# Patient Record
Sex: FEMALE | Race: WHITE | URBAN - METROPOLITAN AREA
[De-identification: names, ages, dates, MRNs, and addresses within clinical notes are randomized per-mention and may not be internally consistent; named-entity substitution may affect disease eponyms.]

---

## 2018-07-09 ENCOUNTER — OFFICE VISIT (OUTPATIENT)
Dept: URGENT CARE | Facility: URGENT CARE | Age: 18
End: 2018-07-09
Payer: OTHER MISCELLANEOUS

## 2018-07-09 VITALS
OXYGEN SATURATION: 98 % | TEMPERATURE: 98.2 F | HEART RATE: 77 BPM | DIASTOLIC BLOOD PRESSURE: 74 MMHG | SYSTOLIC BLOOD PRESSURE: 112 MMHG | WEIGHT: 125.2 LBS

## 2018-07-09 DIAGNOSIS — L23.7 CONTACT DERMATITIS DUE TO POISON IVY: Primary | ICD-10-CM

## 2018-07-09 PROCEDURE — 99203 OFFICE O/P NEW LOW 30 MIN: CPT | Performed by: PHYSICIAN ASSISTANT

## 2018-07-09 RX ORDER — NORETHINDRONE ACETATE AND ETHINYL ESTRADIOL AND FERROUS FUMARATE 1MG-20(24)
KIT ORAL
COMMUNITY
Start: 2018-05-11

## 2018-07-09 RX ORDER — PREDNISONE 10 MG/1
TABLET ORAL
Qty: 45 TABLET | Refills: 0 | Status: SHIPPED | OUTPATIENT
Start: 2018-07-09

## 2018-07-09 ASSESSMENT — ENCOUNTER SYMPTOMS
DIZZINESS: 0
DIARRHEA: 0
SHORTNESS OF BREATH: 0
NECK STIFFNESS: 0
HEADACHES: 0
JOINT SWELLING: 0
VOMITING: 0
COUGH: 0
WOUND: 0
PALPITATIONS: 0
EYES NEGATIVE: 1
ARTHRALGIAS: 0
MUSCULOSKELETAL NEGATIVE: 1
MYALGIAS: 0
WEAKNESS: 0
ALLERGIC/IMMUNOLOGIC NEGATIVE: 1
ENDOCRINE NEGATIVE: 1
RHINORRHEA: 0
LIGHT-HEADEDNESS: 0
BACK PAIN: 0
FEVER: 0
NAUSEA: 0
RESPIRATORY NEGATIVE: 1
SORE THROAT: 0
HEMATOLOGIC/LYMPHATIC NEGATIVE: 1
CHILLS: 0
BRUISES/BLEEDS EASILY: 0
NECK PAIN: 0
CARDIOVASCULAR NEGATIVE: 1

## 2018-07-09 NOTE — PROGRESS NOTES
Chief Complaint:     Chief Complaint   Patient presents with     Hives     Patient complains of hives all over        HPI: Siria Cleaning is a 17 year old female who presents with a rash on the chest, inner thighs, groin and scrotum, legs, arms and face.  Patient is new to [place].  The rash was first noticed  8 days ago, and has not spread. 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  She has been using Calamine lotion, hydrocortizone cream, and Benadryl with minimal relief.  She has had exposures in the past that required treatment with oral prednisone.     Associated Symptoms:none    ROS:      Review of Systems   Constitutional: Negative for chills and fever.   HENT: Negative for congestion, ear pain, rhinorrhea and sore throat.    Eyes: Negative.    Respiratory: Negative.  Negative for cough and shortness of breath.    Cardiovascular: Negative.  Negative for chest pain and palpitations.   Gastrointestinal: Negative for diarrhea, nausea and vomiting.   Endocrine: Negative.    Genitourinary: Negative.    Musculoskeletal: Negative.  Negative for arthralgias, back pain, joint swelling, myalgias, neck pain and neck stiffness.   Skin: Positive for rash. Negative for wound.   Allergic/Immunologic: Negative.  Negative for immunocompromised state.   Neurological: Negative for dizziness, weakness, light-headedness and headaches.   Hematological: Negative.  Does not bruise/bleed easily.     No pertinent family or medical Hx at this time.  Patient has never smoked.      Family History   No family history on file.     Problem history  There is no problem list on file for this patient.       Allergies  No Known Allergies     Social History  Social History     Social History     Marital status: Single     Spouse name: N/A     Number of children: N/A     Years of education: N/A     Occupational History     Not on file.     Social History Main Topics     Smoking status: Never Smoker     Smokeless  tobacco: Never Used     Alcohol use Not on file     Drug use: Not on file     Sexual activity: Not on file     Other Topics Concern     Not on file     Social History Narrative     No narrative on file        Current Meds    Current Outpatient Prescriptions:      JUNEL FE 24 1-20 MG-MCG(24) tablet, , Disp: , Rfl:      predniSONE (DELTASONE) 10 MG tablet, Take 5 tablets for 3 days, then 4 for 3 days, 3 for 3 days, 2 for 3 days, 1 for 3 days., Disp: 45 tablet, Rfl: 0     Immunizations    There is no immunization history on file for this patient.        OBJECTIVE:     Vital signs reviewed by Deric Benitez  /74 (BP Location: Left arm, Patient Position: Chair, Cuff Size: Adult Regular)  Pulse 77  Temp 98.2  F (36.8  C) (Oral)  Wt 125 lb 3.2 oz (56.8 kg)  SpO2 98%     PEFR:  General appearance: healthy, alert and no distress    Physical Exam   Constitutional: She appears well-developed and well-nourished. No distress.   HENT:   Head: Normocephalic and atraumatic.   Right Ear: Tympanic membrane and external ear normal.   Left Ear: Tympanic membrane and external ear normal.   Mouth/Throat: Oropharynx is clear and moist.   Eyes: EOM are normal. Pupils are equal, round, and reactive to light.   Neck: Normal range of motion. Neck supple.   Pulmonary/Chest: Effort normal and breath sounds normal. No respiratory distress.   Lymphadenopathy:     She has no cervical adenopathy.   Neurological: She is alert. No cranial nerve deficit.   Skin: Skin is warm and dry. Rash noted. She is not diaphoretic.   Erythematous macules and plaques in random fashion covering trunk, forearms, legs, and R side of face.   Psychiatric: She has a normal mood and affect.   Nursing note and vitals reviewed.        ASSESSMENT:     Siria was seen today for hives.    Diagnoses and all orders for this visit:    Contact dermatitis due to poison ivy  -     predniSONE (DELTASONE) 10 MG tablet; Take 5 tablets for 3 days, then 4 for 3 days, 3 for 3  days, 2 for 3 days, 1 for 3 days.       PLAN:    Rx for Prednisone taper sent in.  Try Zyrtec as this may be less sedating.  Continue OTC cream and Calamine lotion for itchy spots.  Paperwork filled out for employer.  She may return to work today with no restrictions.  Patient advised to follow up with her PCP if symptoms are not improving in the next 5-7 days.     Deric Benitez

## 2018-07-09 NOTE — MR AVS SNAPSHOT
After Visit Summary   7/9/2018    Siria Cleaning    MRN: [id number]           Patient Information     Date Of Birth          2000        Visit Information        Provider Department      7/9/2018 11:00 AM Deric Benitez PA-C [facility]        Today's Diagnoses     Contact dermatitis due to poison ivy    -  1       Follow-ups after your visit        Who to contact     If you have questions or need follow up information about today's clinic visit or your schedule please contact [facility] directly at [phone].  Normal or non-critical lab and imaging results will be communicated to you by MyChart, letter or phone within 4 business days after the clinic has received the results. If you do not hear from us within 7 days, please contact the clinic through MyChart or phone. If you have a critical or abnormal lab result, we will notify you by phone as soon as possible.  Submit refill requests through [employer] or call your pharmacy and they will forward the refill request to us. Please allow 3 business days for your refill to be completed.          Additional Information About Your Visit        Care EveryWhere ID     This is your Care EveryWhere ID. This could be used by other organizations to access your [place] medical records  [id number]        Your Vitals Were     Pulse Temperature Pulse Oximetry             77 98.2  F (36.8  C) (Oral) 98%          Blood Pressure from Last 3 Encounters:   07/09/18 112/74    Weight from Last 3 Encounters:   07/09/18 125 lb 3.2 oz (56.8 kg) (44 %)*     * Growth percentiles are based on CDC 2-20 Years data.              Today, you had the following     No orders found for display         Today's Medication Changes          These changes are accurate as of 7/9/18 11:14 AM.  If you have any questions, ask your nurse or doctor.               Start taking these medicines.        Dose/Directions    predniSONE 10 MG tablet    Commonly known as:  DELTASONE   Used for:  Contact dermatitis due to poison ivy   Started by:  Deric Benitez PA-C        Take 5 tablets for 3 days, then 4 for 3 days, 3 for 3 days, 2 for 3 days, 1 for 3 days.   Quantity:  45 tablet   Refills:  0            Where to get your medicines      These [place] [facility] [place] [address]  [address], [place] [address]     Phone:  [phone]     predniSONE 10 MG tablet                Primary Care Provider Fax #    Provider Not In System [phone]                Equal Access to Services     [facility]: Hadii carina schmidt hadasho Soomaali, waaxda luqadaha, qaybta kaalmada adeegyada, ang bowling . So [facility] [phone].    ATENCIÓN: Si habla español, tiene a tate disposición servicios gratuitos de asistencia lingüística. Llame al [phone].    We comply with applicable federal civil rights laws and [place] laws. We do not discriminate on the basis of race, color, national origin, age, disability, sex, sexual orientation, or gender identity.            Thank you!     Thank you for choosing [facility]  for your care. Our goal is always to provide you with excellent care. Hearing back from our patients is one way we can continue to improve our services. Please take a few minutes to complete the written survey that you may receive in the mail after your visit with us. Thank you!             Your Updated Medication List - Protect others around you: Learn how to safely use, store and throw away your medicines at www.disposemymeds.org.          This list is accurate as of 7/9/18 11:14 AM.  Always use your most recent med list.                   Brand Name Dispense Instructions for use Diagnosis    JUNEL FE 24 1-20 MG-MCG(24) tablet   Generic drug:  norethin-eth estradiol-fe           predniSONE 10 MG tablet    DELTASONE    45 tablet    Take 5 tablets for  3 days, then 4 for 3 days, 3 for 3 days, 2 for 3 days, 1 for 3 days.    Contact dermatitis due to poison ivy